# Patient Record
Sex: MALE | Race: WHITE
[De-identification: names, ages, dates, MRNs, and addresses within clinical notes are randomized per-mention and may not be internally consistent; named-entity substitution may affect disease eponyms.]

---

## 2017-08-31 NOTE — EKG
[facility]
                                    [address] [place], [place]  [address]
_________________________________________________________________________________________
                                                                 Signed   
 
 
Sinus bradycardia
Septal infarct , age undetermined
Abnormal ECG
No previous ECGs available
Confirmed by GEOFFREY SHEPARD MD ([id number]) on 8/31/2017 5:14:59 PM
 
 
 
 
 
 
 
 
 
 
 
 
 
 
 
 
 
 
 
 
 
 
 
 
 
 
 
 
 
 
 
 
 
 
 
 
 
 
 
 
    Electronically Signed By: GEOFFREY SHEPARD MD  08/31/17 1715
_________________________________________________________________________________________
PATIENT NAME:     JAYANT ROWE                       
MEDICAL RECORD #: [id number]                     Electrocardiogram             
          ACCT #: [id number]  
DATE OF BIRTH:   [birth year]                                       
PHYSICIAN:   GEOFFREY SHEPARD MD           REPORT #: [id number]
REPORT IS CONFIDENTIAL AND NOT TO BE RELEASED WITHOUT AUTHORIZATION

## 2021-02-23 ENCOUNTER — HOSPITAL ENCOUNTER (OUTPATIENT)
Dept: HOSPITAL 46 - DS | Age: 81
Discharge: HOME | End: 2021-02-23
Attending: SURGERY
Payer: COMMERCIAL

## 2021-02-23 VITALS — WEIGHT: 168.87 LBS | HEIGHT: 69 IN | BODY MASS INDEX: 25.01 KG/M2

## 2021-02-23 DIAGNOSIS — Z97.4: ICD-10-CM

## 2021-02-23 DIAGNOSIS — Z86.010: ICD-10-CM

## 2021-02-23 DIAGNOSIS — Z80.0: ICD-10-CM

## 2021-02-23 DIAGNOSIS — D12.6: Primary | ICD-10-CM

## 2021-02-23 DIAGNOSIS — R97.20: ICD-10-CM

## 2021-02-23 DIAGNOSIS — H91.90: ICD-10-CM

## 2021-02-23 DIAGNOSIS — K64.8: ICD-10-CM

## 2021-02-23 DIAGNOSIS — I10: ICD-10-CM

## 2021-02-23 PROCEDURE — 0DBE8ZX EXCISION OF LARGE INTESTINE, VIA NATURAL OR ARTIFICIAL OPENING ENDOSCOPIC, DIAGNOSTIC: ICD-10-PCS | Performed by: SURGERY

## 2021-02-23 NOTE — NUR
PT IS ALERT, ORIENTED AND SUPPORTED BY HIS WIFE YE. PT'S HAD PREVIOUS
SCOPES, GLAD PREP IS OVER. ALL QUESTIONS ASKED ANSWERED. YE HAS A DR
APPT @ 1030 AND IS HOPING SHE CAN MAKE THAT APPT. SHARED WITH PACU STAFF,
THEY WILL WORK TO MAKE IT HAPPEN. PT REQUESTED PRAYER, WILL FOLLOW

## 2021-02-23 NOTE — OR
[facility]
                                    [address], [place]  [address]
_________________________________________________________________________________________
                                                                 Signed   
 
 
DATE OF OPERATION:
02/23/2021
 
SURGEON:
Lobo Fernandez MD
 
PREOPERATIVE DIAGNOSES:
1. Personal history of colonic polyps in 2015 at age 75.
2. Daughter with colon cancer in her 40s.
 
POSTOPERATIVE DIAGNOSES:
1. A 4 mm polyp at 90 cm.
2. Minimal internal hemorrhoids.
 
PROCEDURE:
Colonoscopy with hot biopsy.
 
ESTIMATED BLOOD LOSS:
None.
 
INDICATIONS:
Jayant is a 80-year-old gentleman asked to see me for a followup colonoscopy.  We know
his daughter developed colon cancer in her 40s.  She continues to do well.  Jayant had
an adenomatous polyp removed at age 75 back in 2015.  In the meantime, he continues to
do well.  He has no lower GI complaints.  He had a tiny left prostate nodule on exam and
he had that again today.  It does not seem to have changed.  He always comes with his
wife.  He really has no lower GI complaints.  Overall, he still has good functional
status.  I gave Jayant and his wife a pamphlet on colonoscopy.  They remember that
quite well.  They remember our bowel prep from 5 years ago.  We had gone through it line
by line.  He recalls the need for IV conscious sedation.  He expressed understanding and
wished to proceed. 
 
PROCEDURE NOTE:
Jayant was taken into our endoscopy suite and placed in the left lateral decubitus
position.  He was given 100 mcg of fentanyl and 3 mg of Versed to cover the case.  A
digital rectal exam was performed.  His prostate is moderately enlarged, and again a
little bit of a nodule on the left that does not seem to have changed.  The adult
colonoscope had been introduced and advanced all around into the cecum under direct
visualization of camera.  It took some abdominal compression in order to advance the
scope.  His prep was quite excellent.  We could easily see the appendiceal orifice and
the ileocecal valve.  The scope was slowly withdrawn.  We found a small 4 mm polyp back
at 90 cm.  This was easily removed with the help of hot biopsy forceps.  There was no
 
    Electronically Signed By: LOBO FERNANDEZ MD  02/23/21 1109
_________________________________________________________________________________________
PATIENT NAME:     JAYANT ROWE                    
MEDICAL RECORD #: [id number]            OPERATIVE REPORT              
          ACCT #: [id number]  
DATE OF BIRTH:   [birth year]            REPORT #: [id number]      
PHYSICIAN:        LOBO FERNANDEZ MD             
PCP:              JOSIAH MURILLO MD      
REPORT IS CONFIDENTIAL AND NOT TO BE RELEASED WITHOUT AUTHORIZATION
 
 
                                  [facility]
                                    [address], [place]  [address]
_________________________________________________________________________________________
                                                                 Signed   
 
 
diverticulosis.  The rectum was unremarkable.  Upon retroflexion of scope, he has very
minimal internal hemorrhoid tissue.  After this, the gas was suctioned out and the
colonoscope removed.  Jayant tolerated the procedure quite well. 
 
RECOMMENDATIONS:
I will see Jayant back in my office in 7 to 14 days to review his results.
 
 
 
            ________________________________________
            MD RACHEL Baxter/MARCOL
Job #:  [id number]
DD:  02/23/2021 07:59:15
DT:  02/23/2021 08:14:52
 
cc:            MD Lobo García MD
 
 
Copies:  JOSIAH MURILLO MD, ANDREW L MD
~
 
 
 
 
 
 
 
 
 
 
 
 
 
 
 
 
 
    Electronically Signed By: LOBO FERNANDEZ MD  02/23/21 1109
_________________________________________________________________________________________
PATIENT NAME:     JAYANT ROWE TASIA                    
MEDICAL RECORD #: [id number]            OPERATIVE REPORT              
          ACCT #: [id number]  
DATE OF BIRTH:   [birth year]            REPORT #: [id number]      
PHYSICIAN:        LOBO FERNANDEZ MD             
PCP:              JOSIAH MURILLO MD      
REPORT IS CONFIDENTIAL AND NOT TO BE RELEASED WITHOUT AUTHORIZATION

## 2021-02-23 NOTE — NUR
02/23/21 0759 Lashawn Dumas
0754 PATIENT ARRIVES TO PACU SLEEPING. OPENS EYE OFF/ON, BUT VERY
DROWSY. RESP EVEN AND UNLABORED, NC AT 3 LITERS. PATIENT BP IS LOW ON
ARRIVAL, DR FERNANDEZ AWARE, 2ND LITER LR ORDERED. PATIENT FOLLOWS
COMMANDS TO REPOSITION SELF TO BACK. BP REPEATED, CONTINUES TO BE LOW
BUT IMPROVED.

## 2021-02-24 NOTE — PATH
[facility]
                                    [address], [place]  [address]
_________________________________________________________________________________________
                                                                 Signed   
 
 
 
SPECIMEN(S): A COLON POLYP AT 90 CM
 
SPECIMEN SOURCE:
A. COLON POLYP AT 90 CM
 
CLINICAL HISTORY:
Family hx and persona hx of colon polyps.  Daughter hx of colon CA.
MICROSCOPIC DESCRIPTION:
Histologic sections of all submitted blocks are examined by light microscopy. 
These findings, together with the gross examination, support the pathologic 
diagnosis. 
 
FINAL PATHOLOGIC DIAGNOSIS:
Colon, polyp at 90 cm, polypectomy:
-  Tubular adenoma.
-  Negative for high-grade dysplasia or malignancy.
NAL:cml:C2NR
 
GROSS DESCRIPTION:
The specimen, labeled "Hewes, colon polyp at 90 cm," is received in formalin 
and consists of one tan soft tissue fragment(s) that measure 0.1 cm in greatest 
dimension. The specimen is entirely 
submitted in cassette (A1).
[place] (under the direct supervision of a pathologist)
The Gross Description was prepared using a voice recognition system. The report 
was reviewed for accuracy; however, sound-alike word errors, addition and/or 
deletions may occur. If there is any 
question about this report, please contact Client Services.
 
PERFORMING LABORATORY:
The technical component was performed by [facility], [address] [place] [id number] (Medical Director: Yomaira Ramirez MD; CLIA# [id number]). 
[facility], [address] [address] (CLIA# [id number]). 
 
Diagnostician:  Suyapa Tubbs MD
Pathologist
Electronically Signed 02/24/2021
 
 
 
                                                                                    
_________________________________________________________________________________________
PATIENT NAME:     JAYANT ROWE                    
MEDICAL RECORD #: [id number]            PATHOLOGY                     
          ACCT #: [id number]       ACCESSION #: [id number]     
DATE OF BIRTH:   [birth year]            REPORT #: [id number]       
PHYSICIAN:        [facility]              
PCP:              JOSIAH MURILLO MD      
REPORT IS CONFIDENTIAL AND NOT TO BE RELEASED WITHOUT AUTHORIZATION
 
 
                                  [address] [place], [place]  [address]
_________________________________________________________________________________________
                                                                 Signed   
 
 
Copies:                                
~
 
 
 
 
 
 
 
 
 
 
 
 
 
 
 
 
 
 
 
 
 
 
 
 
 
 
 
 
 
 
 
 
 
 
 
 
 
 
 
 
 
                                                                                    
_________________________________________________________________________________________
PATIENT NAME:     JAYANT ROWE                    
MEDICAL RECORD #: [id number]            PATHOLOGY                     
          ACCT #: [id number]       ACCESSION #: [id number]     
DATE OF BIRTH:   [birth year]            REPORT #: [id number]       
PHYSICIAN:        [facility]              
PCP:              JOSIAH MURILLO MD      
REPORT IS CONFIDENTIAL AND NOT TO BE RELEASED WITHOUT AUTHORIZATION